# Patient Record
Sex: FEMALE | URBAN - METROPOLITAN AREA
[De-identification: names, ages, dates, MRNs, and addresses within clinical notes are randomized per-mention and may not be internally consistent; named-entity substitution may affect disease eponyms.]

---

## 2022-03-29 ENCOUNTER — APPOINTMENT (RX ONLY)
Dept: URBAN - METROPOLITAN AREA CLINIC 116 | Facility: CLINIC | Age: 22
Setting detail: DERMATOLOGY
End: 2022-03-29

## 2022-03-29 DIAGNOSIS — Z71.89 OTHER SPECIFIED COUNSELING: ICD-10-CM

## 2022-03-29 DIAGNOSIS — L74.51 PRIMARY FOCAL HYPERHIDROSIS: ICD-10-CM

## 2022-03-29 PROBLEM — L74.512 PRIMARY FOCAL HYPERHIDROSIS, PALMS: Status: ACTIVE | Noted: 2022-03-29

## 2022-03-29 PROBLEM — L74.513 PRIMARY FOCAL HYPERHIDROSIS, SOLES: Status: ACTIVE | Noted: 2022-03-29

## 2022-03-29 PROBLEM — L74.519 PRIMARY FOCAL HYPERHIDROSIS, UNSPECIFIED: Status: ACTIVE | Noted: 2022-03-29

## 2022-03-29 PROCEDURE — ? COUNSELING

## 2022-03-29 PROCEDURE — 99202 OFFICE O/P NEW SF 15 MIN: CPT

## 2022-03-29 PROCEDURE — ? PRESCRIPTION

## 2022-03-29 RX ORDER — GLYCOPYRRONIUM 2.4 G/100G
CLOTH TOPICAL
Qty: 1 | Refills: 10 | Status: ERX | COMMUNITY
Start: 2022-03-29

## 2022-03-29 RX ADMIN — GLYCOPYRRONIUM: 2.4 CLOTH TOPICAL at 00:00

## 2022-03-29 ASSESSMENT — LOCATION DETAILED DESCRIPTION DERM
LOCATION DETAILED: RIGHT HYPOTHENAR EMINENCE
LOCATION DETAILED: LEFT ULNAR PALM
LOCATION DETAILED: LEFT LATERAL DORSAL FOOT
LOCATION DETAILED: RIGHT LATERAL DORSAL FOOT
LOCATION DETAILED: LEFT PLANTAR FOREFOOT OVERLYING 4TH METATARSAL
LOCATION DETAILED: RIGHT MEDIAL PLANTAR HEEL
LOCATION DETAILED: LEFT RADIAL PALM
LOCATION DETAILED: LEFT MEDIAL PLANTAR HEEL
LOCATION DETAILED: RIGHT THENAR EMINENCE
LOCATION DETAILED: RIGHT PLANTAR FOREFOOT OVERLYING 2ND METATARSAL
LOCATION DETAILED: RIGHT RADIAL PALM
LOCATION DETAILED: LEFT PROXIMAL RADIAL THUMB

## 2022-03-29 ASSESSMENT — LOCATION SIMPLE DESCRIPTION DERM
LOCATION SIMPLE: LEFT PLANTAR SURFACE
LOCATION SIMPLE: LEFT HAND
LOCATION SIMPLE: LEFT THUMB
LOCATION SIMPLE: RIGHT FOOT
LOCATION SIMPLE: LEFT FOOT
LOCATION SIMPLE: RIGHT PLANTAR SURFACE
LOCATION SIMPLE: RIGHT HAND

## 2022-03-29 ASSESSMENT — LOCATION ZONE DERM
LOCATION ZONE: FEET
LOCATION ZONE: FINGER
LOCATION ZONE: HAND

## 2022-03-29 NOTE — HPI: SWEATING (HYPERHIDROSIS)
How Severe Is It?: mild
Sweating Severity Scale: 4- The sweating is intolerable and always interferes with daily activities
Is This A New Presentation, Or A Follow-Up?: Sweating
Additional History: Patient states she has been dealing with this condition her entire life. Started when she was young.

## 2023-01-18 ENCOUNTER — APPOINTMENT (RX ONLY)
Dept: URBAN - METROPOLITAN AREA CLINIC 116 | Facility: CLINIC | Age: 23
Setting detail: DERMATOLOGY
End: 2023-01-18

## 2023-01-18 DIAGNOSIS — Z71.89 OTHER SPECIFIED COUNSELING: ICD-10-CM

## 2023-01-18 DIAGNOSIS — L74.51 PRIMARY FOCAL HYPERHIDROSIS: ICD-10-CM | Status: INADEQUATELY CONTROLLED

## 2023-01-18 PROBLEM — L74.512 PRIMARY FOCAL HYPERHIDROSIS, PALMS: Status: ACTIVE | Noted: 2023-01-18

## 2023-01-18 PROBLEM — L74.519 PRIMARY FOCAL HYPERHIDROSIS, UNSPECIFIED: Status: ACTIVE | Noted: 2023-01-18

## 2023-01-18 PROBLEM — L74.513 PRIMARY FOCAL HYPERHIDROSIS, SOLES: Status: ACTIVE | Noted: 2023-01-18

## 2023-01-18 PROCEDURE — ? COUNSELING

## 2023-01-18 PROCEDURE — ? PRESCRIPTION

## 2023-01-18 PROCEDURE — ? TREATMENT REGIMEN

## 2023-01-18 PROCEDURE — 99213 OFFICE O/P EST LOW 20 MIN: CPT

## 2023-01-18 RX ORDER — GLYCOPYRROLATE 1 MG/1
TABLET ORAL
Qty: 60 | Refills: 3 | Status: ERX | COMMUNITY
Start: 2023-01-18

## 2023-01-18 RX ADMIN — GLYCOPYRROLATE: 1 TABLET ORAL at 00:00

## 2023-01-18 ASSESSMENT — LOCATION DETAILED DESCRIPTION DERM
LOCATION DETAILED: RIGHT HYPOTHENAR EMINENCE
LOCATION DETAILED: LEFT RADIAL PALM
LOCATION DETAILED: LEFT MEDIAL PLANTAR HEEL
LOCATION DETAILED: LEFT PROXIMAL RADIAL THUMB
LOCATION DETAILED: LEFT PLANTAR FOREFOOT OVERLYING 4TH METATARSAL
LOCATION DETAILED: RIGHT PLANTAR FOREFOOT OVERLYING 2ND METATARSAL
LOCATION DETAILED: RIGHT LATERAL DORSAL FOOT
LOCATION DETAILED: RIGHT MEDIAL PLANTAR HEEL
LOCATION DETAILED: RIGHT RADIAL PALM
LOCATION DETAILED: LEFT ULNAR PALM
LOCATION DETAILED: LEFT LATERAL DORSAL FOOT
LOCATION DETAILED: RIGHT THENAR EMINENCE

## 2023-01-18 ASSESSMENT — LOCATION SIMPLE DESCRIPTION DERM
LOCATION SIMPLE: LEFT PLANTAR SURFACE
LOCATION SIMPLE: LEFT THUMB
LOCATION SIMPLE: LEFT HAND
LOCATION SIMPLE: RIGHT PLANTAR SURFACE
LOCATION SIMPLE: RIGHT FOOT
LOCATION SIMPLE: RIGHT HAND
LOCATION SIMPLE: LEFT FOOT

## 2023-01-18 ASSESSMENT — LOCATION ZONE DERM
LOCATION ZONE: FINGER
LOCATION ZONE: FEET
LOCATION ZONE: HAND

## 2023-01-18 NOTE — PROCEDURE: TREATMENT REGIMEN
Detail Level: Zone
Initiate Treatment: - glycopyrrolate 1mg take PO BID
Discontinue Regimen: - Qbrexa swabs

## 2023-08-30 ENCOUNTER — APPOINTMENT (RX ONLY)
Dept: URBAN - METROPOLITAN AREA CLINIC 41 | Facility: CLINIC | Age: 23
Setting detail: DERMATOLOGY
End: 2023-08-30

## 2023-08-30 DIAGNOSIS — L74.51 PRIMARY FOCAL HYPERHIDROSIS: ICD-10-CM | Status: INADEQUATELY CONTROLLED

## 2023-08-30 PROBLEM — L74.510 PRIMARY FOCAL HYPERHIDROSIS, AXILLA: Status: ACTIVE | Noted: 2023-08-30

## 2023-08-30 PROBLEM — L74.513 PRIMARY FOCAL HYPERHIDROSIS, SOLES: Status: ACTIVE | Noted: 2023-08-30

## 2023-08-30 PROBLEM — L74.511 PRIMARY FOCAL HYPERHIDROSIS, FACE: Status: ACTIVE | Noted: 2023-08-30

## 2023-08-30 PROBLEM — L74.512 PRIMARY FOCAL HYPERHIDROSIS, PALMS: Status: ACTIVE | Noted: 2023-08-30

## 2023-08-30 PROCEDURE — 99204 OFFICE O/P NEW MOD 45 MIN: CPT

## 2023-08-30 PROCEDURE — ? PRESCRIPTION MEDICATION MANAGEMENT

## 2023-08-30 PROCEDURE — ? PRESCRIPTION

## 2023-08-30 PROCEDURE — ? COUNSELING

## 2023-08-30 RX ORDER — GLYCOPYRROLATE 1 MG/1
3-4 TABLET ORAL QD
Qty: 120 | Refills: 0 | Status: ERX | COMMUNITY
Start: 2023-08-30

## 2023-08-30 RX ADMIN — GLYCOPYRROLATE 3-4: 1 TABLET ORAL at 00:00

## 2023-08-30 ASSESSMENT — LOCATION SIMPLE DESCRIPTION DERM
LOCATION SIMPLE: SUPERIOR FOREHEAD
LOCATION SIMPLE: RIGHT AXILLARY VAULT
LOCATION SIMPLE: LEFT HAND
LOCATION SIMPLE: LEFT AXILLARY VAULT
LOCATION SIMPLE: RIGHT PLANTAR SURFACE
LOCATION SIMPLE: RIGHT HAND
LOCATION SIMPLE: LEFT PLANTAR SURFACE

## 2023-08-30 ASSESSMENT — LOCATION ZONE DERM
LOCATION ZONE: HAND
LOCATION ZONE: FEET
LOCATION ZONE: FACE
LOCATION ZONE: AXILLAE

## 2023-08-30 ASSESSMENT — LOCATION DETAILED DESCRIPTION DERM
LOCATION DETAILED: LEFT MEDIAL PLANTAR MIDFOOT
LOCATION DETAILED: LEFT ULNAR PALM
LOCATION DETAILED: SUPERIOR MID FOREHEAD
LOCATION DETAILED: RIGHT MEDIAL PLANTAR MIDFOOT
LOCATION DETAILED: LEFT AXILLARY VAULT
LOCATION DETAILED: RIGHT AXILLARY VAULT
LOCATION DETAILED: RIGHT RADIAL PALM

## 2023-08-30 NOTE — PROCEDURE: PRESCRIPTION MEDICATION MANAGEMENT
Detail Level: Zone
Plan: Patient states that she has previously tried Qbrexa and Glycorryprolate but not at the same time. Will have her increase dose and use together, then re-evaluate in 1 month. May consider Botox in the future if still not improved and affecting daily life
Render In Strict Bullet Format?: No
Initiate Treatment: Restart glycopyrrolate (but increase from 1-2 to 3-4 QD) \\nRestart Qbrexa wipes QHS (while taking glycopyrrolate)